# Patient Record
Sex: FEMALE | Race: WHITE | NOT HISPANIC OR LATINO | URBAN - METROPOLITAN AREA
[De-identification: names, ages, dates, MRNs, and addresses within clinical notes are randomized per-mention and may not be internally consistent; named-entity substitution may affect disease eponyms.]

---

## 2020-05-08 ENCOUNTER — IMPORTED ENCOUNTER (OUTPATIENT)
Dept: URBAN - METROPOLITAN AREA CLINIC 50 | Facility: CLINIC | Age: 60
End: 2020-05-08

## 2020-07-02 ENCOUNTER — IMPORTED ENCOUNTER (OUTPATIENT)
Dept: URBAN - METROPOLITAN AREA CLINIC 50 | Facility: CLINIC | Age: 60
End: 2020-07-02

## 2021-04-17 ASSESSMENT — TONOMETRY
OS_IOP_MMHG: 19
OS_IOP_MMHG: 16
OD_IOP_MMHG: 17
OD_IOP_MMHG: 20

## 2021-04-17 ASSESSMENT — VISUAL ACUITY
OS_CC: 20/25+
OD_CC: 20/20
OD_CC: J1+@ 16 IN
OD_CC: 20/20
OS_CC: J1+@ 16 IN
OS_CC: 20/20-1

## 2021-05-12 ENCOUNTER — PROBLEM (OUTPATIENT)
Dept: URBAN - METROPOLITAN AREA CLINIC 49 | Facility: CLINIC | Age: 61
End: 2021-05-12

## 2021-05-12 DIAGNOSIS — H43.812: ICD-10-CM

## 2021-05-12 PROCEDURE — 92134 CPTRZ OPH DX IMG PST SGM RTA: CPT

## 2021-05-12 PROCEDURE — 92014 COMPRE OPH EXAM EST PT 1/>: CPT

## 2021-05-12 ASSESSMENT — VISUAL ACUITY
OD_CC: 20/20
OU_CC: J1+
OU_CC: 20/20

## 2021-05-12 ASSESSMENT — TONOMETRY
OS_IOP_MMHG: 16
OD_IOP_MMHG: 16

## 2021-05-12 NOTE — PATIENT DISCUSSION
PVD is symptomatic.  Instructed patient to call immediately if  she has any increase in symptoms.  will recheck patient in 1 week.

## 2021-05-19 ENCOUNTER — DILATED FUNDUS EXAM (OUTPATIENT)
Dept: URBAN - METROPOLITAN AREA CLINIC 49 | Facility: CLINIC | Age: 61
End: 2021-05-19

## 2021-05-19 DIAGNOSIS — H43.812: ICD-10-CM

## 2021-05-19 PROCEDURE — 92014 COMPRE OPH EXAM EST PT 1/>: CPT

## 2021-05-19 ASSESSMENT — TONOMETRY
OS_IOP_MMHG: 19
OD_IOP_MMHG: 19

## 2021-05-19 ASSESSMENT — VISUAL ACUITY
OD_CC: 20/15-2
OS_CC: 20/20+1

## 2021-07-27 ENCOUNTER — REFRACTION (OUTPATIENT)
Dept: URBAN - METROPOLITAN AREA CLINIC 49 | Facility: CLINIC | Age: 61
End: 2021-07-27

## 2021-07-27 DIAGNOSIS — H52.4: ICD-10-CM

## 2021-07-27 DIAGNOSIS — H43.812: ICD-10-CM

## 2021-07-27 PROCEDURE — 92015 DETERMINE REFRACTIVE STATE: CPT

## 2021-07-27 ASSESSMENT — VISUAL ACUITY
OU_CC: J1+
OU_CC: 20/20
OS_CC: 20/20-2
OD_CC: 20/20-1

## 2022-05-25 ENCOUNTER — COMPREHENSIVE EXAM (OUTPATIENT)
Dept: URBAN - METROPOLITAN AREA CLINIC 49 | Facility: CLINIC | Age: 62
End: 2022-05-25

## 2022-05-25 DIAGNOSIS — H43.813: ICD-10-CM

## 2022-05-25 DIAGNOSIS — H04.123: ICD-10-CM

## 2022-05-25 PROCEDURE — 92014 COMPRE OPH EXAM EST PT 1/>: CPT

## 2022-05-25 ASSESSMENT — VISUAL ACUITY
OS_CC: 20/20
OU_CC: J1+
OD_CC: 20/20

## 2022-05-25 ASSESSMENT — TONOMETRY
OS_IOP_MMHG: 17
OD_IOP_MMHG: 17

## 2024-03-28 ENCOUNTER — COMPREHENSIVE EXAM (OUTPATIENT)
Dept: URBAN - METROPOLITAN AREA CLINIC 49 | Facility: LOCATION | Age: 64
End: 2024-03-28

## 2024-03-28 DIAGNOSIS — H04.123: ICD-10-CM

## 2024-03-28 DIAGNOSIS — H25.13: ICD-10-CM

## 2024-03-28 DIAGNOSIS — H43.813: ICD-10-CM

## 2024-03-28 DIAGNOSIS — H52.4: ICD-10-CM

## 2024-03-28 PROCEDURE — 92015 DETERMINE REFRACTIVE STATE: CPT

## 2024-03-28 PROCEDURE — 99214 OFFICE O/P EST MOD 30 MIN: CPT

## 2024-03-28 ASSESSMENT — VISUAL ACUITY
OD_CC: 20/20
OS_GLARE: 20/20
OD_GLARE: 20/20
OS_CC: 20/30
OS_CC: 20/30
OD_GLARE: 20/20
OD_CC: 20/20
OS_PH: 20/20
OS_GLARE: 20/20

## 2024-03-28 ASSESSMENT — KERATOMETRY
OS_AXISANGLE_DEGREES: 137
OD_K1POWER_DIOPTERS: 41.75
OS_AXISANGLE2_DEGREES: 47
OS_K1POWER_DIOPTERS: 42.50
OD_K2POWER_DIOPTERS: 42.25
OS_K2POWER_DIOPTERS: 43.00
OD_AXISANGLE_DEGREES: 12
OD_AXISANGLE2_DEGREES: 102

## 2024-03-28 ASSESSMENT — TONOMETRY
OD_IOP_MMHG: 18
OS_IOP_MMHG: 20

## 2025-03-19 ENCOUNTER — COMPREHENSIVE EXAM (OUTPATIENT)
Age: 65
End: 2025-03-19

## 2025-03-19 DIAGNOSIS — H52.4: ICD-10-CM

## 2025-03-19 DIAGNOSIS — H04.123: ICD-10-CM

## 2025-03-19 DIAGNOSIS — H25.13: ICD-10-CM

## 2025-03-19 DIAGNOSIS — H43.813: ICD-10-CM

## 2025-03-19 PROCEDURE — 99214 OFFICE O/P EST MOD 30 MIN: CPT

## 2025-03-19 PROCEDURE — 92015 DETERMINE REFRACTIVE STATE: CPT
